# Patient Record
Sex: MALE | Race: WHITE
[De-identification: names, ages, dates, MRNs, and addresses within clinical notes are randomized per-mention and may not be internally consistent; named-entity substitution may affect disease eponyms.]

---

## 2018-03-12 ENCOUNTER — HOSPITAL ENCOUNTER (OUTPATIENT)
Dept: HOSPITAL 47 - RADCTMAIN | Age: 43
Discharge: HOME | End: 2018-03-12
Payer: COMMERCIAL

## 2018-03-12 DIAGNOSIS — N20.2: Primary | ICD-10-CM

## 2018-03-12 DIAGNOSIS — R93.8: ICD-10-CM

## 2018-03-12 PROCEDURE — 74176 CT ABD & PELVIS W/O CONTRAST: CPT

## 2018-03-12 NOTE — CT
EXAMINATION TYPE: CT abdomen pelvis wo con

 

DATE OF EXAM: 3/12/2018

 

COMPARISON: 2/20/2014

 

HISTORY: Left sided abdominal pain

 

CT DLP: 1581 mGycm

Automated exposure control for dose reduction was used.

 

TECHNIQUE:  Helical acquisition of images was performed from the lung bases through the pelvis.

 

FINDINGS: 

 

LUNG BASES: Heart size is stable and there is a tiny pericardial effusion.

 

LIVER/GB: No significant abnormality is appreciated.

 

PANCREAS: No significant abnormality is seen.

 

SPLEEN: No significant abnormality is seen.

 

ADRENALS: No significant abnormality is seen.

 

KIDNEYS: 

Right kidney: There are 2 less than 5 mm calculi with no hydronephrosis. There is a 2 mm distal right
 ureteral calculus.

Left kidney: There are 2 less than 5 mm calculi with no hydronephrosis.

 

FREE AIR:  No free air is visualized

 

ADENOPATHY:  None visualized

 

URINARY BLADDER:  No significant abnormality is seen.

 

OSSEOUS STRUCTURES:  No significant abnormality is seen.

 

BOWEL:  Mild wall thickening of the sigmoid colon without evidence of inflammatory changes. Mild ortega
ges of diverticulosis..

 

OTHER: Aorta of normal caliber with mild atherosclerotic changes. No evidence of aneurysm.

 

IMPRESSION:

1. Nonobstructing bilateral nephrolithiasis with a 2 mm distal right ureteral calculus. No hydronephr
osis.

2. There is mild wall thickening of the sigmoid colon with no surrounding inflammatory changes. Corre
late for mild colitis otherwise consider direct visualization to assess the mucosa.

## 2019-06-28 ENCOUNTER — HOSPITAL ENCOUNTER (EMERGENCY)
Dept: HOSPITAL 47 - EC | Age: 44
Discharge: HOME | End: 2019-06-28
Payer: COMMERCIAL

## 2019-06-28 VITALS
RESPIRATION RATE: 18 BRPM | TEMPERATURE: 98.3 F | HEART RATE: 80 BPM | DIASTOLIC BLOOD PRESSURE: 79 MMHG | SYSTOLIC BLOOD PRESSURE: 115 MMHG

## 2019-06-28 DIAGNOSIS — T26.62XA: Primary | ICD-10-CM

## 2019-06-28 DIAGNOSIS — Z88.5: ICD-10-CM

## 2019-06-28 DIAGNOSIS — Z87.891: ICD-10-CM

## 2019-06-28 PROCEDURE — 99283 EMERGENCY DEPT VISIT LOW MDM: CPT
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
## 2021-05-07 ENCOUNTER — HOSPITAL ENCOUNTER (EMERGENCY)
Dept: HOSPITAL 47 - EC | Age: 46
Discharge: HOME | End: 2021-05-07
Payer: COMMERCIAL

## 2021-05-07 VITALS — RESPIRATION RATE: 16 BRPM | TEMPERATURE: 98.3 F

## 2021-05-07 VITALS — SYSTOLIC BLOOD PRESSURE: 112 MMHG | DIASTOLIC BLOOD PRESSURE: 65 MMHG

## 2021-05-07 VITALS — HEART RATE: 68 BPM

## 2021-05-07 DIAGNOSIS — E86.0: Primary | ICD-10-CM

## 2021-05-07 DIAGNOSIS — F12.90: ICD-10-CM

## 2021-05-07 DIAGNOSIS — R55: ICD-10-CM

## 2021-05-07 DIAGNOSIS — Z87.442: ICD-10-CM

## 2021-05-07 DIAGNOSIS — F17.200: ICD-10-CM

## 2021-05-07 LAB
ALBUMIN SERPL-MCNC: 3.6 G/DL (ref 3.5–5)
ALP SERPL-CCNC: 84 U/L (ref 38–126)
ALT SERPL-CCNC: 19 U/L (ref 4–49)
ANION GAP SERPL CALC-SCNC: 10 MMOL/L
APTT BLD: 21.6 SEC (ref 22–30)
AST SERPL-CCNC: 22 U/L (ref 17–59)
BASOPHILS # BLD AUTO: 0 K/UL (ref 0–0.2)
BASOPHILS NFR BLD AUTO: 0 %
BUN SERPL-SCNC: 16 MG/DL (ref 9–20)
CALCIUM SPEC-MCNC: 9.4 MG/DL (ref 8.4–10.2)
CHLORIDE SERPL-SCNC: 110 MMOL/L (ref 98–107)
CO2 SERPL-SCNC: 20 MMOL/L (ref 22–30)
D DIMER PPP FEU-MCNC: 0.31 MG/L FEU (ref ?–0.6)
EOSINOPHIL # BLD AUTO: 0.2 K/UL (ref 0–0.7)
EOSINOPHIL NFR BLD AUTO: 2 %
ERYTHROCYTE [DISTWIDTH] IN BLOOD BY AUTOMATED COUNT: 4.28 M/UL (ref 4.3–5.9)
ERYTHROCYTE [DISTWIDTH] IN BLOOD: 13.9 % (ref 11.5–15.5)
GLUCOSE SERPL-MCNC: 149 MG/DL (ref 74–99)
HCT VFR BLD AUTO: 41 % (ref 39–53)
HGB BLD-MCNC: 14.4 GM/DL (ref 13–17.5)
HYALINE CASTS UR QL AUTO: 9 /LPF (ref 0–2)
INR PPP: 0.9 (ref ?–1.2)
LYMPHOCYTES # SPEC AUTO: 1.8 K/UL (ref 1–4.8)
LYMPHOCYTES NFR SPEC AUTO: 20 %
MAGNESIUM SPEC-SCNC: 1.8 MG/DL (ref 1.6–2.3)
MCH RBC QN AUTO: 33.7 PG (ref 25–35)
MCHC RBC AUTO-ENTMCNC: 35.2 G/DL (ref 31–37)
MCV RBC AUTO: 95.7 FL (ref 80–100)
MONOCYTES # BLD AUTO: 0.3 K/UL (ref 0–1)
MONOCYTES NFR BLD AUTO: 3 %
NEUTROPHILS # BLD AUTO: 6.5 K/UL (ref 1.3–7.7)
NEUTROPHILS NFR BLD AUTO: 73 %
PH UR: 6 [PH] (ref 5–8)
PLATELET # BLD AUTO: 189 K/UL (ref 150–450)
POTASSIUM SERPL-SCNC: 3.8 MMOL/L (ref 3.5–5.1)
PROT SERPL-MCNC: 6.2 G/DL (ref 6.3–8.2)
PROT UR QL: (no result)
PT BLD: 9.7 SEC (ref 9–12)
RBC UR QL: 1 /HPF (ref 0–5)
SODIUM SERPL-SCNC: 140 MMOL/L (ref 137–145)
SP GR UR: 1.02 (ref 1–1.03)
SQUAMOUS UR QL AUTO: 1 /HPF (ref 0–4)
UROBILINOGEN UR QL STRIP: <2 MG/DL (ref ?–2)
WBC # BLD AUTO: 8.9 K/UL (ref 3.8–10.6)
WBC #/AREA URNS HPF: 9 /HPF (ref 0–5)

## 2021-05-07 PROCEDURE — 84484 ASSAY OF TROPONIN QUANT: CPT

## 2021-05-07 PROCEDURE — 93005 ELECTROCARDIOGRAM TRACING: CPT

## 2021-05-07 PROCEDURE — 81001 URINALYSIS AUTO W/SCOPE: CPT

## 2021-05-07 PROCEDURE — 85730 THROMBOPLASTIN TIME PARTIAL: CPT

## 2021-05-07 PROCEDURE — 96360 HYDRATION IV INFUSION INIT: CPT

## 2021-05-07 PROCEDURE — 85610 PROTHROMBIN TIME: CPT

## 2021-05-07 PROCEDURE — 36415 COLL VENOUS BLD VENIPUNCTURE: CPT

## 2021-05-07 PROCEDURE — 85379 FIBRIN DEGRADATION QUANT: CPT

## 2021-05-07 PROCEDURE — 85025 COMPLETE CBC W/AUTO DIFF WBC: CPT

## 2021-05-07 PROCEDURE — 80053 COMPREHEN METABOLIC PANEL: CPT

## 2021-05-07 PROCEDURE — 96361 HYDRATE IV INFUSION ADD-ON: CPT

## 2021-05-07 PROCEDURE — 99284 EMERGENCY DEPT VISIT MOD MDM: CPT

## 2021-05-07 PROCEDURE — 71046 X-RAY EXAM CHEST 2 VIEWS: CPT

## 2021-05-07 PROCEDURE — 83735 ASSAY OF MAGNESIUM: CPT

## 2021-05-07 NOTE — ED
General Adult HPI





- General


Chief complaint: Syncope


Stated complaint: Syncope


Time Seen by Provider: 05/07/21 15:34


Source: patient, EMS, RN notes reviewed, old records reviewed


Mode of arrival: EMS


Limitations: no limitations





- History of Present Illness


Initial comments: 





45-year-old male presenting with near syncopal episode.  Patient had been outsid

e, in a seated position after doing some non-strenuous work when he began 

feeling lightheaded, flushed and diaphoretic.  He became disoriented and 

momentarily lost consciousness.  He denied associated dyspnea.  The nose is 

chest pain or palpitations.  He has been eating and drinking normally today.  

Denies lower extremity pain or swelling.  Denies headache.  Denies focal 

numbness or weakness.





- Related Data


                                Home Medications











 Medication  Instructions  Recorded  Confirmed


 


Loratadine [Claritin] 10 mg PO DAILY PRN 06/13/14 05/07/21


 


Cetirizine HCl [Zyrtec] 10 mg PO DAILY PRN 05/07/21 05/07/21











                                    Allergies











Allergy/AdvReac Type Severity Reaction Status Date / Time


 


codeine Allergy  Rash/Hives/ Verified 05/07/21 16:56





   Nausea/Vomi  





   ting  


 


morphine Allergy  Rash/Hives/ Verified 05/07/21 16:56





   Nausea/Vomi  





   ting  














Review of Systems


ROS Statement: 


Those systems with pertinent positive or pertinent negative responses have been 

documented in the HPI.





ROS Other: All systems not noted in ROS Statement are negative.





Past Medical History


Additional Past Medical History / Comment(s): SINUS PROBLEMS, IBS, KIDNEY STONES

 LAST 5-6 YRS-


History of Any Multi-Drug Resistant Organisms: None Reported


Past Surgical History: Orthopedic Surgery


Additional Past Surgical History / Comment(s): RT SHOULDER ROTATOR CUFF REPAIR. 

 LITHOTRIPSY


Past Anesthesia/Blood Transfusion Reactions: Motion Sickness


Additional Past Anesthesia/Blood Transfusion Reaction / Comment(s): GETS CAR 

SICK & BOAT SICK


Past Psychological History: No Psychological Hx Reported


Smoking Status: Current some day smoker


Past Alcohol Use History: None Reported


Past Drug Use History: Marijuana





General Exam


Limitations: no limitations


General appearance: alert, in no apparent distress


Head exam: Present: atraumatic, normocephalic


Eye exam: Present: normal appearance, PERRL


ENT exam: Present: normal exam


Neck exam: Present: normal inspection.  Absent: tenderness, meningismus


Respiratory exam: Present: normal lung sounds bilaterally.  Absent: respiratory 

distress, wheezes


Cardiovascular Exam: Present: regular rate, normal rhythm


GI/Abdominal exam: Present: soft.  Absent: distended, tenderness, guarding, 

rebound


Extremities exam: Present: normal inspection, normal capillary refill.  Absent: 

pedal edema, calf tenderness


Neurological exam: Present: alert, oriented X3, CN II-XII intact.  Absent: motor

 sensory deficit


Psychiatric exam: Present: normal affect, normal mood


Skin exam: Present: warm, dry, intact.  Absent: cyanosis, diaphoretic





Course


                                   Vital Signs











  05/07/21 05/07/21





  15:28 17:53


 


Temperature 98.3 F 


 


Pulse Rate 84 68


 


Respiratory 16 16





Rate  


 


Blood Pressure 112/62 


 


O2 Sat by Pulse 97 98





Oximetry  














EKG Findings





- EKG Comments:


EKG Findings:: EKG: Normal sinus rhythm, S1 Q 3 T3 pattern no ST segment 

elevation.  Rate of 89, MI interval 146, QRS duration 96, 





Medical Decision Making





- Medical Decision Making





45-year-old male who presented with near syncopal episode.  Patient did admit to

 smoking some marijuana earlier the day and having one beer.  He feels a little 

tired but otherwise is asymptomatic.  No chest pain.  No palpitations.  EKG is 

sinus rate chest x-ray negative for acute cardiopulmonary disease.  Normal CBC, 

stable hemoglobin.  Negative d-dimer.  Negative troponin 2.  Patient wants to 

go home and rest.  Will follow with his primary care physician.  Return 

parameters are discussed.





- Lab Data


Result diagrams: 


                                 05/07/21 15:49





                                 05/07/21 15:49


                                   Lab Results











  05/07/21 05/07/21 05/07/21 Range/Units





  15:49 15:49 15:49 


 


WBC  8.9    (3.8-10.6)  k/uL


 


RBC  4.28 L    (4.30-5.90)  m/uL


 


Hgb  14.4    (13.0-17.5)  gm/dL


 


Hct  41.0    (39.0-53.0)  %


 


MCV  95.7    (80.0-100.0)  fL


 


MCH  33.7    (25.0-35.0)  pg


 


MCHC  35.2    (31.0-37.0)  g/dL


 


RDW  13.9    (11.5-15.5)  %


 


Plt Count  189    (150-450)  k/uL


 


MPV  9.1    


 


Neutrophils %  73    %


 


Lymphocytes %  20    %


 


Monocytes %  3    %


 


Eosinophils %  2    %


 


Basophils %  0    %


 


Neutrophils #  6.5    (1.3-7.7)  k/uL


 


Lymphocytes #  1.8    (1.0-4.8)  k/uL


 


Monocytes #  0.3    (0-1.0)  k/uL


 


Eosinophils #  0.2    (0-0.7)  k/uL


 


Basophils #  0.0    (0-0.2)  k/uL


 


PT   9.7   (9.0-12.0)  sec


 


INR   0.9   (<1.2)  


 


APTT   21.6 L   (22.0-30.0)  sec


 


D-Dimer   0.31   (<0.60)  mg/L FEU


 


Sodium     (137-145)  mmol/L


 


Potassium     (3.5-5.1)  mmol/L


 


Chloride     ()  mmol/L


 


Carbon Dioxide     (22-30)  mmol/L


 


Anion Gap     mmol/L


 


BUN     (9-20)  mg/dL


 


Creatinine     (0.66-1.25)  mg/dL


 


Est GFR (CKD-EPI)AfAm     (>60 ml/min/1.73 sqM)  


 


Est GFR (CKD-EPI)NonAf     (>60 ml/min/1.73 sqM)  


 


Glucose     (74-99)  mg/dL


 


Calcium     (8.4-10.2)  mg/dL


 


Magnesium     (1.6-2.3)  mg/dL


 


Total Bilirubin     (0.2-1.3)  mg/dL


 


AST     (17-59)  U/L


 


ALT     (4-49)  U/L


 


Alkaline Phosphatase     ()  U/L


 


Troponin I     (0.000-0.034)  ng/mL


 


Total Protein     (6.3-8.2)  g/dL


 


Albumin     (3.5-5.0)  g/dL


 


Urine Color    Yellow  


 


Urine Appearance    Clear  (Clear)  


 


Urine pH    6.0  (5.0-8.0)  


 


Ur Specific Gravity    1.023  (1.001-1.035)  


 


Urine Protein    1+ H  (Negative)  


 


Urine Glucose (UA)    Negative  (Negative)  


 


Urine Ketones    Negative  (Negative)  


 


Urine Blood    Negative  (Negative)  


 


Urine Nitrite    Negative  (Negative)  


 


Urine Bilirubin    Negative  (Negative)  


 


Urine Urobilinogen    <2.0  (<2.0)  mg/dL


 


Ur Leukocyte Esterase    Small H  (Negative)  


 


Urine RBC    1  (0-5)  /hpf


 


Urine WBC    9 H  (0-5)  /hpf


 


Ur Squamous Epith Cells    1  (0-4)  /hpf


 


Hyaline Casts    9 H  (0-2)  /lpf


 


Urine Mucus    Moderate H  (None)  /hpf














  05/07/21 05/07/21 05/07/21 Range/Units





  15:49 15:49 17:03 


 


WBC     (3.8-10.6)  k/uL


 


RBC     (4.30-5.90)  m/uL


 


Hgb     (13.0-17.5)  gm/dL


 


Hct     (39.0-53.0)  %


 


MCV     (80.0-100.0)  fL


 


MCH     (25.0-35.0)  pg


 


MCHC     (31.0-37.0)  g/dL


 


RDW     (11.5-15.5)  %


 


Plt Count     (150-450)  k/uL


 


MPV     


 


Neutrophils %     %


 


Lymphocytes %     %


 


Monocytes %     %


 


Eosinophils %     %


 


Basophils %     %


 


Neutrophils #     (1.3-7.7)  k/uL


 


Lymphocytes #     (1.0-4.8)  k/uL


 


Monocytes #     (0-1.0)  k/uL


 


Eosinophils #     (0-0.7)  k/uL


 


Basophils #     (0-0.2)  k/uL


 


PT     (9.0-12.0)  sec


 


INR     (<1.2)  


 


APTT     (22.0-30.0)  sec


 


D-Dimer     (<0.60)  mg/L FEU


 


Sodium  140    (137-145)  mmol/L


 


Potassium  3.8    (3.5-5.1)  mmol/L


 


Chloride  110 H    ()  mmol/L


 


Carbon Dioxide  20 L    (22-30)  mmol/L


 


Anion Gap  10    mmol/L


 


BUN  16    (9-20)  mg/dL


 


Creatinine  1.19    (0.66-1.25)  mg/dL


 


Est GFR (CKD-EPI)AfAm  84    (>60 ml/min/1.73 sqM)  


 


Est GFR (CKD-EPI)NonAf  72    (>60 ml/min/1.73 sqM)  


 


Glucose  149 H    (74-99)  mg/dL


 


Calcium  9.4    (8.4-10.2)  mg/dL


 


Magnesium  1.8    (1.6-2.3)  mg/dL


 


Total Bilirubin  0.4    (0.2-1.3)  mg/dL


 


AST  22    (17-59)  U/L


 


ALT  19    (4-49)  U/L


 


Alkaline Phosphatase  84    ()  U/L


 


Troponin I   <0.012  <0.012  (0.000-0.034)  ng/mL


 


Total Protein  6.2 L    (6.3-8.2)  g/dL


 


Albumin  3.6    (3.5-5.0)  g/dL


 


Urine Color     


 


Urine Appearance     (Clear)  


 


Urine pH     (5.0-8.0)  


 


Ur Specific Gravity     (1.001-1.035)  


 


Urine Protein     (Negative)  


 


Urine Glucose (UA)     (Negative)  


 


Urine Ketones     (Negative)  


 


Urine Blood     (Negative)  


 


Urine Nitrite     (Negative)  


 


Urine Bilirubin     (Negative)  


 


Urine Urobilinogen     (<2.0)  mg/dL


 


Ur Leukocyte Esterase     (Negative)  


 


Urine RBC     (0-5)  /hpf


 


Urine WBC     (0-5)  /hpf


 


Ur Squamous Epith Cells     (0-4)  /hpf


 


Hyaline Casts     (0-2)  /lpf


 


Urine Mucus     (None)  /hpf














Disposition


Clinical Impression: 


 Dehydration, Near syncope





Disposition: HOME SELF-CARE


Condition: Good


Instructions (If sedation given, give patient instructions):  Near Syncope (ED)


Additional Instructions: 


Please follow up with Dr. Montejo, please return with any worsening or changing 

symptoms.  Please abstain from both marijuana and alcohol, please drink plenty 

of fluids.


Is patient prescribed a controlled substance at d/c from ED?: No


Referrals: 


Black Montejo MD [Primary Care Provider] - 1-2 days


Time of Disposition: 18:28

## 2021-05-07 NOTE — XR
EXAMINATION TYPE: XR chest 2V

 

DATE OF EXAM: 5/7/2021

 

COMPARISON: 11/3/2010

 

INDICATION: Syncope

 

TECHNIQUE: Single frontal view of the chest is obtained.

 

FINDINGS:  

The heart size is normal.  

The pulmonary vasculature is normal.  

The lungs are clear.  

 

 

IMPRESSION:  

1. No acute pulmonary process.

## 2023-10-30 ENCOUNTER — HOSPITAL ENCOUNTER (OUTPATIENT)
Dept: HOSPITAL 47 - ORWHC2ENDO | Age: 48
Discharge: HOME | End: 2023-10-30
Payer: COMMERCIAL

## 2023-10-30 VITALS — HEART RATE: 74 BPM | DIASTOLIC BLOOD PRESSURE: 76 MMHG | RESPIRATION RATE: 16 BRPM | SYSTOLIC BLOOD PRESSURE: 150 MMHG

## 2023-10-30 VITALS — TEMPERATURE: 97 F

## 2023-10-30 VITALS — BODY MASS INDEX: 34.4 KG/M2

## 2023-10-30 DIAGNOSIS — Z91.040: ICD-10-CM

## 2023-10-30 DIAGNOSIS — Z87.891: ICD-10-CM

## 2023-10-30 DIAGNOSIS — Z88.5: ICD-10-CM

## 2023-10-30 DIAGNOSIS — Z87.442: ICD-10-CM

## 2023-10-30 DIAGNOSIS — Z79.899: ICD-10-CM

## 2023-10-30 DIAGNOSIS — Z12.11: Primary | ICD-10-CM

## 2023-10-30 DIAGNOSIS — K58.9: ICD-10-CM

## 2023-10-30 PROCEDURE — 45378 DIAGNOSTIC COLONOSCOPY: CPT

## 2023-10-30 NOTE — P.OP
Date of Procedure: 10/30/23


Preoperative Diagnosis: 


Screening colonoscopy


Postoperative Diagnosis: 


Normal colon


Procedure(s) Performed: 


Colonoscopy


Anesthesia: MAC


Surgeon: Guerrero Eaton


Pathology: none sent


Condition: stable


Disposition: PACU


Description of Procedure: 





PROCEDURE:  The patient was placed on the endoscopy table in the lateral 

position.  Digital rectal examination was performed which revealed no 

abnormalities.  The prostate was symmetrical without nodules.  Flexible 

colonoscope was then placed in the patient's anus and passed throughout the 

entire colon.  The ileocecal valve was visualized.  The cecum, ascending, 

transverse, descending and sigmoid colon were normal.  The rectum was normal as 

well.  There were no masses, polyps or diverticula noted in the entire colon. 





SUMMARY OF FINDINGS:  


Normal colonoscopy.

## 2023-10-30 NOTE — P.GSHP
History of Present Illness


H&P Date: 10/30/23


Chief Complaint: Screening colonoscopy





This a 48-year-old male presents today for screening colonoscopy.  Patient 

denies a significant GI complaints.





Past Medical History


Additional Past Medical History / Comment(s): SINUS PROBLEMS, IBS, KIDNEY STONES

LAST 5-6 YRS-


History of Any Multi-Drug Resistant Organisms: None Reported


Past Surgical History: Orthopedic Surgery


Additional Past Surgical History / Comment(s): RT SHOULDER ROTATOR CUFF REPAIR. 

LITHOTRIPSY, colonoscopy


Past Anesthesia/Blood Transfusion Reactions: Motion Sickness


Additional Past Anesthesia/Blood Transfusion Reaction / Comment(s): GETS CAR 

SICK & BOAT SICK


Smoking Status: Former smoker, Vaper





- Past Family History


  ** Mother


Family Medical History: No Reported History





  ** Father


Family Medical History: Cancer





Medications and Allergies


                                Home Medications











 Medication  Instructions  Recorded  Confirmed  Type


 


Loratadine [Claritin] 10 mg PO DAILY PRN 06/13/14 10/30/23 History


 


Cetirizine HCl [Zyrtec] 10 mg PO DAILY PRN 05/07/21 10/30/23 History








                                    Allergies











Allergy/AdvReac Type Severity Reaction Status Date / Time


 


codeine Allergy  Rash/Hives/ Verified 10/30/23 13:24





   Nausea/Vomi  





   ting  


 


latex Allergy  Rash/Hives Verified 10/30/23 13:24


 


morphine Allergy  Rash/Hives/ Verified 10/30/23 13:24





   Nausea/Vomi  





   ting  














Surgical - Exam


                                   Vital Signs











Temp Pulse Resp BP Pulse Ox


 


 97.0 F L  65   18   132/73   94 L


 


 10/30/23 13:31  10/30/23 13:31  10/30/23 13:31  10/30/23 13:31  10/30/23 13:31














- General


well developed, well nourished, no distress





- Eyes


PERRL





- ENT


normal pinna





- Neck


no masses





- Respiratory


normal expansion





- Cardiovascular


Rhythm: regular





- Abdomen


Abdomen: soft, non tender





Assessment and Plan


Assessment: 





We'll perform screening colonoscopy.